# Patient Record
Sex: FEMALE | Race: OTHER | Employment: OTHER | URBAN - METROPOLITAN AREA
[De-identification: names, ages, dates, MRNs, and addresses within clinical notes are randomized per-mention and may not be internally consistent; named-entity substitution may affect disease eponyms.]

---

## 2022-02-17 ENCOUNTER — NEW PATIENT (OUTPATIENT)
Dept: URBAN - METROPOLITAN AREA CLINIC 32 | Facility: CLINIC | Age: 52
End: 2022-02-17

## 2022-02-17 DIAGNOSIS — H52.13: ICD-10-CM

## 2022-02-17 DIAGNOSIS — H52.4: ICD-10-CM

## 2022-02-17 DIAGNOSIS — H52.203: ICD-10-CM

## 2022-02-17 PROCEDURE — 92004 COMPRE OPH EXAM NEW PT 1/>: CPT

## 2022-02-17 PROCEDURE — 92015 DETERMINE REFRACTIVE STATE: CPT

## 2022-02-17 ASSESSMENT — VISUAL ACUITY
OU_CC: J2
OS_CC: 20/30-2
OU_CC: 20/30
OD_CC: 20/30-2
OS_CC: J2
OD_CC: J2

## 2022-02-17 ASSESSMENT — TONOMETRY
OD_IOP_MMHG: 21
OS_IOP_MMHG: 21

## 2022-03-18 ENCOUNTER — CONTACT LENSES/GLASSES VISIT (OUTPATIENT)
Dept: URBAN - METROPOLITAN AREA CLINIC 32 | Facility: CLINIC | Age: 52
End: 2022-03-18

## 2022-03-18 DIAGNOSIS — H52.4: ICD-10-CM

## 2022-03-18 DIAGNOSIS — H52.13: ICD-10-CM

## 2022-03-18 DIAGNOSIS — H52.203: ICD-10-CM

## 2022-03-18 PROCEDURE — 92015GRNC REFRACTION GLASSES RECHECK - NO CHARGE

## 2022-03-21 ASSESSMENT — VISUAL ACUITY
OS_CC: 20/40-2
OU_CC: 20/40
OD_CC: 20/40-1

## 2025-01-01 NOTE — PATIENT DISCUSSION
Glasses Rx given.
Observe.
Patient given Rx for glasses.
Recommended observation.
Retinal tear and detachment warning symptoms reviewed.
show